# Patient Record
Sex: FEMALE | URBAN - METROPOLITAN AREA
[De-identification: names, ages, dates, MRNs, and addresses within clinical notes are randomized per-mention and may not be internally consistent; named-entity substitution may affect disease eponyms.]

---

## 2023-04-14 ENCOUNTER — OFFICE VISIT (OUTPATIENT)
Dept: URBAN - METROPOLITAN AREA CLINIC 87 | Facility: CLINIC | Age: 78
End: 2023-04-14
Payer: MEDICARE

## 2023-04-14 DIAGNOSIS — H25.13 AGE-RELATED NUCLEAR CATARACT, BILATERAL: ICD-10-CM

## 2023-04-14 DIAGNOSIS — H35.371 PUCKERING OF MACULA, RIGHT EYE: Primary | ICD-10-CM

## 2023-04-14 DIAGNOSIS — H35.3132 NEXDTVE AGE-RELATED MCLR DEGN, BILATERAL, INTERMED DRY STAGE: ICD-10-CM

## 2023-04-14 PROCEDURE — 92134 CPTRZ OPH DX IMG PST SGM RTA: CPT | Performed by: OPHTHALMOLOGY

## 2023-04-14 PROCEDURE — 99204 OFFICE O/P NEW MOD 45 MIN: CPT | Performed by: OPHTHALMOLOGY

## 2023-04-14 ASSESSMENT — INTRAOCULAR PRESSURE
OD: 13
OS: 14

## 2023-04-14 NOTE — IMPRESSION/PLAN
Impression: Nexdtve age-related mclr degn, bilateral, intermed dry stage: H35.3132. Bilateral. Plan: Examination and OCT confirm the presence of RPE changes and drusen consistent with dry macular degeneration. The diagnosis, natural history, and prognosis of dry AMD as well as the current lack of treatment were discussed at length. The patient was instructed to begin taking AREDS 2 protocol anti-oxidant vitamins. The use of an Amsler grid and the importance of weekly self-monitoring were reviewed. The patient understands that smoking is the most significant modifiable risk factor for the development of advanced AMD, and also understands that if they do smoke (or have history of smoking in the past 5 years), they cannot take vitamin A/beta-carotene, since it may increase their risk for lung cancer. The patient was instructed to call our office immediately upon any decreased vision or increased symptoms.

## 2023-04-14 NOTE — IMPRESSION/PLAN
Impression: Puckering of macula, right eye: H35.371. Right. OCT: 
OD: ERM, Drusen OS: drusen Plan: Examination and OCT reveals an ERM which is distorting the macular contour. The diagnosis, natural history, and prognosis of epiretinal membranes, as well as the risks and benefits of vitrectomy with membrane peeling versus observation were discussed at length. Given the patient's current visual acuity and hindrance on activities of daily living, surgical correction was recommended. The patient understands that while the distortion should julián, the final acuity, which can take months to achieve, may or may not be an improvement over baseline. 

Rec: 25g PPV, ERM-ILM peel OD x ERM (30mins non urgent) (if patient wishes)